# Patient Record
Sex: FEMALE | Race: WHITE | NOT HISPANIC OR LATINO
[De-identification: names, ages, dates, MRNs, and addresses within clinical notes are randomized per-mention and may not be internally consistent; named-entity substitution may affect disease eponyms.]

---

## 2018-11-28 PROBLEM — Z00.00 ENCOUNTER FOR PREVENTIVE HEALTH EXAMINATION: Status: ACTIVE | Noted: 2018-11-28

## 2018-11-30 ENCOUNTER — APPOINTMENT (OUTPATIENT)
Dept: OTOLARYNGOLOGY | Facility: CLINIC | Age: 78
End: 2018-11-30
Payer: MEDICARE

## 2018-11-30 VITALS
DIASTOLIC BLOOD PRESSURE: 81 MMHG | OXYGEN SATURATION: 98 % | HEART RATE: 68 BPM | HEIGHT: 59 IN | WEIGHT: 125 LBS | RESPIRATION RATE: 15 BRPM | SYSTOLIC BLOOD PRESSURE: 175 MMHG | BODY MASS INDEX: 25.2 KG/M2 | TEMPERATURE: 97.9 F

## 2018-11-30 DIAGNOSIS — H91.90 UNSPECIFIED HEARING LOSS, UNSPECIFIED EAR: ICD-10-CM

## 2018-11-30 DIAGNOSIS — R42 DIZZINESS AND GIDDINESS: ICD-10-CM

## 2018-11-30 DIAGNOSIS — Z78.9 OTHER SPECIFIED HEALTH STATUS: ICD-10-CM

## 2018-11-30 DIAGNOSIS — Z83.3 FAMILY HISTORY OF DIABETES MELLITUS: ICD-10-CM

## 2018-11-30 DIAGNOSIS — Z82.2 FAMILY HISTORY OF DEAFNESS AND HEARING LOSS: ICD-10-CM

## 2018-11-30 DIAGNOSIS — Z82.49 FAMILY HISTORY OF ISCHEMIC HEART DISEASE AND OTHER DISEASES OF THE CIRCULATORY SYSTEM: ICD-10-CM

## 2018-11-30 DIAGNOSIS — D32.0 BENIGN NEOPLASM OF CEREBRAL MENINGES: ICD-10-CM

## 2018-11-30 DIAGNOSIS — Z80.9 FAMILY HISTORY OF MALIGNANT NEOPLASM, UNSPECIFIED: ICD-10-CM

## 2018-11-30 DIAGNOSIS — Z86.73 PERSONAL HISTORY OF TRANSIENT ISCHEMIC ATTACK (TIA), AND CEREBRAL INFARCTION W/OUT RESIDUAL DEFICITS: ICD-10-CM

## 2018-11-30 PROCEDURE — 99204 OFFICE O/P NEW MOD 45 MIN: CPT

## 2018-11-30 RX ORDER — CALCIUM CARBONATE 260MG(650)
TABLET,CHEWABLE ORAL
Refills: 0 | Status: ACTIVE | COMMUNITY

## 2018-11-30 RX ORDER — SODIUM FLUORIDE 0.1 MG/ML
RINSE ORAL
Refills: 0 | Status: ACTIVE | COMMUNITY

## 2018-11-30 RX ORDER — ASPIRIN 81 MG
TABLET,CHEWABLE ORAL
Refills: 0 | Status: ACTIVE | COMMUNITY

## 2018-11-30 RX ORDER — METOPROLOL TARTRATE 75 MG/1
TABLET, FILM COATED ORAL
Refills: 0 | Status: ACTIVE | COMMUNITY

## 2018-11-30 RX ORDER — SENNOSIDES 8.6 MG
TABLET ORAL
Refills: 0 | Status: ACTIVE | COMMUNITY

## 2018-11-30 RX ORDER — RANITIDINE HYDROCHLORIDE 300 MG/1
TABLET, FILM COATED ORAL
Refills: 0 | Status: ACTIVE | COMMUNITY

## 2018-11-30 RX ORDER — MULTIVITAMIN
TABLET ORAL
Refills: 0 | Status: ACTIVE | COMMUNITY

## 2018-12-01 RX ORDER — ROSUVASTATIN CALCIUM 5 MG/1
5 TABLET, FILM COATED ORAL
Qty: 90 | Refills: 0 | Status: ACTIVE | COMMUNITY
Start: 2018-08-06

## 2018-12-01 RX ORDER — METOPROLOL SUCCINATE 25 MG/1
25 TABLET, EXTENDED RELEASE ORAL
Qty: 90 | Refills: 0 | Status: ACTIVE | COMMUNITY
Start: 2018-05-21

## 2018-12-01 RX ORDER — CIPROFLOXACIN HYDROCHLORIDE 250 MG/1
250 TABLET, FILM COATED ORAL
Qty: 14 | Refills: 0 | Status: ACTIVE | COMMUNITY
Start: 2018-09-21

## 2018-12-03 ENCOUNTER — APPOINTMENT (OUTPATIENT)
Dept: OTOLARYNGOLOGY | Facility: CLINIC | Age: 78
End: 2018-12-03
Payer: MEDICARE

## 2018-12-03 VITALS
SYSTOLIC BLOOD PRESSURE: 159 MMHG | OXYGEN SATURATION: 98 % | TEMPERATURE: 98.6 F | HEART RATE: 71 BPM | DIASTOLIC BLOOD PRESSURE: 88 MMHG

## 2018-12-03 DIAGNOSIS — H81.49 VERTIGO OF CENTRAL ORIGIN, UNSPECIFIED EAR: ICD-10-CM

## 2018-12-03 DIAGNOSIS — H90.42 SENSORINEURAL HEARING LOSS, UNILATERAL, LEFT EAR, WITH UNRESTRICTED HEARING ON THE CONTRALATERAL SIDE: ICD-10-CM

## 2018-12-03 PROCEDURE — 99213 OFFICE O/P EST LOW 20 MIN: CPT

## 2018-12-03 PROCEDURE — V5010 ASSESSMENT FOR HEARING AID: CPT

## 2018-12-03 PROCEDURE — 92540 BASIC VESTIBULAR EVALUATION: CPT

## 2018-12-03 PROCEDURE — 92537 CALORIC VSTBLR TEST W/REC: CPT

## 2018-12-17 ENCOUNTER — APPOINTMENT (OUTPATIENT)
Dept: PHARMACY | Facility: CLINIC | Age: 78
End: 2018-12-17
Payer: SELF-PAY

## 2018-12-17 PROCEDURE — V5299A: CUSTOM | Mod: NC

## 2019-01-04 ENCOUNTER — APPOINTMENT (OUTPATIENT)
Dept: PHARMACY | Facility: CLINIC | Age: 79
End: 2019-01-04
Payer: SELF-PAY

## 2019-01-04 PROCEDURE — V5264C: CUSTOM

## 2019-01-04 PROCEDURE — V5261E: CUSTOM

## 2019-01-18 ENCOUNTER — APPOINTMENT (OUTPATIENT)
Dept: PHARMACY | Facility: CLINIC | Age: 79
End: 2019-01-18
Payer: SELF-PAY

## 2019-01-18 PROCEDURE — V5299A: CUSTOM | Mod: NC

## 2019-07-01 ENCOUNTER — APPOINTMENT (OUTPATIENT)
Dept: PHARMACY | Facility: CLINIC | Age: 79
End: 2019-07-01
Payer: SELF-PAY

## 2019-07-01 PROCEDURE — V5299A: CUSTOM | Mod: NC

## 2019-07-11 ENCOUNTER — APPOINTMENT (OUTPATIENT)
Dept: PHARMACY | Facility: CLINIC | Age: 79
End: 2019-07-11

## 2019-10-10 ENCOUNTER — APPOINTMENT (OUTPATIENT)
Dept: PHARMACY | Facility: CLINIC | Age: 79
End: 2019-10-10
Payer: SELF-PAY

## 2019-10-10 PROCEDURE — V5299A: CUSTOM | Mod: NC

## 2020-07-08 ENCOUNTER — APPOINTMENT (OUTPATIENT)
Dept: PHARMACY | Facility: CLINIC | Age: 80
End: 2020-07-08
Payer: SELF-PAY

## 2020-07-08 ENCOUNTER — APPOINTMENT (OUTPATIENT)
Dept: OTOLARYNGOLOGY | Facility: CLINIC | Age: 80
End: 2020-07-08
Payer: MEDICARE

## 2020-07-08 PROCEDURE — V5299A: CUSTOM | Mod: NC

## 2020-07-08 PROCEDURE — 92557 COMPREHENSIVE HEARING TEST: CPT

## 2020-07-08 PROCEDURE — 92550 TYMPANOMETRY & REFLEX THRESH: CPT

## 2021-04-12 ENCOUNTER — APPOINTMENT (OUTPATIENT)
Dept: PHARMACY | Facility: CLINIC | Age: 81
End: 2021-04-12
Payer: SELF-PAY

## 2021-04-12 PROCEDURE — V5299A: CUSTOM | Mod: NC

## 2021-10-12 ENCOUNTER — APPOINTMENT (OUTPATIENT)
Dept: PHARMACY | Facility: CLINIC | Age: 81
End: 2021-10-12
Payer: SELF-PAY

## 2021-10-12 ENCOUNTER — APPOINTMENT (OUTPATIENT)
Dept: OTOLARYNGOLOGY | Facility: CLINIC | Age: 81
End: 2021-10-12
Payer: MEDICARE

## 2021-10-12 ENCOUNTER — APPOINTMENT (OUTPATIENT)
Dept: PHARMACY | Facility: CLINIC | Age: 81
End: 2021-10-12
Payer: MEDICARE

## 2021-10-12 PROCEDURE — 92550 TYMPANOMETRY & REFLEX THRESH: CPT

## 2021-10-12 PROCEDURE — V5299A: CUSTOM | Mod: NC

## 2021-10-12 PROCEDURE — 92557 COMPREHENSIVE HEARING TEST: CPT

## 2022-04-22 ENCOUNTER — APPOINTMENT (OUTPATIENT)
Dept: PHARMACY | Facility: CLINIC | Age: 82
End: 2022-04-22
Payer: SELF-PAY

## 2022-04-22 PROCEDURE — V5266A: CUSTOM

## 2022-10-21 ENCOUNTER — APPOINTMENT (OUTPATIENT)
Dept: OTOLARYNGOLOGY | Facility: CLINIC | Age: 82
End: 2022-10-21

## 2022-10-21 PROCEDURE — 92557 COMPREHENSIVE HEARING TEST: CPT

## 2022-10-21 PROCEDURE — 92550 TYMPANOMETRY & REFLEX THRESH: CPT | Mod: 52

## 2023-04-21 ENCOUNTER — APPOINTMENT (OUTPATIENT)
Dept: PHARMACY | Facility: CLINIC | Age: 83
End: 2023-04-21
Payer: SELF-PAY

## 2023-04-21 PROCEDURE — V5266A: CUSTOM

## 2023-10-03 ENCOUNTER — APPOINTMENT (OUTPATIENT)
Dept: PHARMACY | Facility: CLINIC | Age: 83
End: 2023-10-03
Payer: SELF-PAY

## 2023-10-03 PROCEDURE — V5299A: CUSTOM

## 2023-10-20 ENCOUNTER — APPOINTMENT (OUTPATIENT)
Dept: OTOLARYNGOLOGY | Facility: CLINIC | Age: 83
End: 2023-10-20

## 2023-12-21 ENCOUNTER — APPOINTMENT (OUTPATIENT)
Dept: PHARMACY | Facility: CLINIC | Age: 83
End: 2023-12-21
Payer: MEDICARE

## 2023-12-21 ENCOUNTER — APPOINTMENT (OUTPATIENT)
Dept: PHARMACY | Facility: CLINIC | Age: 83
End: 2023-12-21
Payer: SELF-PAY

## 2023-12-21 ENCOUNTER — APPOINTMENT (OUTPATIENT)
Dept: OTOLARYNGOLOGY | Facility: CLINIC | Age: 83
End: 2023-12-21
Payer: MEDICARE

## 2023-12-21 VITALS
HEART RATE: 72 BPM | OXYGEN SATURATION: 97 % | WEIGHT: 125 LBS | HEIGHT: 59 IN | SYSTOLIC BLOOD PRESSURE: 158 MMHG | DIASTOLIC BLOOD PRESSURE: 84 MMHG | BODY MASS INDEX: 25.2 KG/M2

## 2023-12-21 VITALS — BODY MASS INDEX: 23.6 KG/M2 | WEIGHT: 125 LBS | HEIGHT: 61 IN

## 2023-12-21 DIAGNOSIS — H90.3 SENSORINEURAL HEARING LOSS, BILATERAL: ICD-10-CM

## 2023-12-21 DIAGNOSIS — T67.2XXA: ICD-10-CM

## 2023-12-21 DIAGNOSIS — Z86.79 PERSONAL HISTORY OF OTHER DISEASES OF THE CIRCULATORY SYSTEM: ICD-10-CM

## 2023-12-21 DIAGNOSIS — H61.23 IMPACTED CERUMEN, BILATERAL: ICD-10-CM

## 2023-12-21 PROCEDURE — G0268 REMOVAL OF IMPACTED WAX MD: CPT

## 2023-12-21 PROCEDURE — 99203 OFFICE O/P NEW LOW 30 MIN: CPT | Mod: 25

## 2023-12-21 PROCEDURE — 92557 COMPREHENSIVE HEARING TEST: CPT

## 2023-12-21 PROCEDURE — V5299A: CUSTOM

## 2023-12-21 PROCEDURE — 92550 TYMPANOMETRY & REFLEX THRESH: CPT | Mod: 52

## 2023-12-21 RX ORDER — OFLOXACIN OTIC 3 MG/ML
0.3 SOLUTION AURICULAR (OTIC)
Qty: 1 | Refills: 1 | Status: ACTIVE | COMMUNITY
Start: 2023-12-21 | End: 1900-01-01

## 2023-12-21 RX ORDER — SERTRALINE HYDROCHLORIDE 50 MG/1
50 TABLET, FILM COATED ORAL
Refills: 0 | Status: ACTIVE | COMMUNITY

## 2023-12-21 NOTE — ASSESSMENT
[FreeTextEntry1] : 1. b cerumen impaction -cerumen removed -ear felt better -Ofloxacin drops in right ear  2. b snhl - revealed b hf snhl- decreased when compared with 10/21/23; b nl tympanograms -results reviewed with pt  -recommended she continue with hearing aids RTC in 1 yr with rpt ; call sooner for any issues

## 2023-12-21 NOTE — HISTORY OF PRESENT ILLNESS
[de-identified] : Followup visit for this 84 y/o F with h/o b snhl last seen in 2018. She wears b hearing aids and is here to check her ears. Her mother has history of hearing loss. No significant noise exposure. She does not use qtips. Nonsmoker.

## 2023-12-21 NOTE — PROCEDURE
[Cerumen Impaction] : Cerumen Impaction [Same] : same as the Pre Op Dx. [] : Removal of Cerumen [FreeTextEntry5] : b copious cerumen removed atraumatically with curette r>l, b tms nl (3) adequate

## 2023-12-21 NOTE — DATA REVIEWED
[de-identified] :  revealed b hf snhl- decreased when compared with 10/21/23; b nl tympanograms -results reviewed with pt

## 2023-12-21 NOTE — PHYSICAL EXAM
[de-identified] : b copious cerumen removed atraumatically with curette r>l, b tms nl [Normal] : no nystagmus [de-identified] : gait steady

## 2024-12-19 ENCOUNTER — APPOINTMENT (OUTPATIENT)
Dept: PHARMACY | Facility: CLINIC | Age: 84
End: 2024-12-19

## 2025-01-07 ENCOUNTER — APPOINTMENT (OUTPATIENT)
Dept: OTOLARYNGOLOGY | Facility: CLINIC | Age: 85
End: 2025-01-07
Payer: MEDICARE

## 2025-01-07 ENCOUNTER — NON-APPOINTMENT (OUTPATIENT)
Age: 85
End: 2025-01-07

## 2025-01-07 ENCOUNTER — APPOINTMENT (OUTPATIENT)
Dept: PHARMACY | Facility: CLINIC | Age: 85
End: 2025-01-07
Payer: SELF-PAY

## 2025-01-07 VITALS
HEART RATE: 71 BPM | TEMPERATURE: 98.3 F | OXYGEN SATURATION: 97 % | DIASTOLIC BLOOD PRESSURE: 79 MMHG | WEIGHT: 130 LBS | HEIGHT: 61 IN | BODY MASS INDEX: 24.55 KG/M2 | SYSTOLIC BLOOD PRESSURE: 168 MMHG

## 2025-01-07 VITALS — SYSTOLIC BLOOD PRESSURE: 154 MMHG | HEART RATE: 63 BPM | DIASTOLIC BLOOD PRESSURE: 78 MMHG

## 2025-01-07 DIAGNOSIS — H90.3 SENSORINEURAL HEARING LOSS, BILATERAL: ICD-10-CM

## 2025-01-07 DIAGNOSIS — H61.23 IMPACTED CERUMEN, BILATERAL: ICD-10-CM

## 2025-01-07 PROCEDURE — 99213 OFFICE O/P EST LOW 20 MIN: CPT

## 2025-01-07 PROCEDURE — 92557 COMPREHENSIVE HEARING TEST: CPT

## 2025-01-07 PROCEDURE — V5299A: CUSTOM

## 2025-01-07 PROCEDURE — 92550 TYMPANOMETRY & REFLEX THRESH: CPT | Mod: 52
